# Patient Record
Sex: FEMALE | Race: WHITE | NOT HISPANIC OR LATINO
[De-identification: names, ages, dates, MRNs, and addresses within clinical notes are randomized per-mention and may not be internally consistent; named-entity substitution may affect disease eponyms.]

---

## 2018-05-21 ENCOUNTER — TRANSCRIPTION ENCOUNTER (OUTPATIENT)
Age: 63
End: 2018-05-21

## 2018-05-21 NOTE — ASU PATIENT PROFILE, ADULT - ABLE TO REACH PT
no/Message left on answering machine with pre operative instructions, time of arrival, and  hospital phone number provided.

## 2018-05-22 ENCOUNTER — OUTPATIENT (OUTPATIENT)
Dept: OUTPATIENT SERVICES | Facility: HOSPITAL | Age: 63
LOS: 1 days | End: 2018-05-22
Payer: MEDICAID

## 2018-05-22 VITALS
OXYGEN SATURATION: 98 % | SYSTOLIC BLOOD PRESSURE: 115 MMHG | HEIGHT: 60 IN | TEMPERATURE: 98 F | WEIGHT: 109.35 LBS | DIASTOLIC BLOOD PRESSURE: 62 MMHG | HEART RATE: 73 BPM | RESPIRATION RATE: 14 BRPM

## 2018-05-22 VITALS
HEART RATE: 88 BPM | RESPIRATION RATE: 13 BRPM | SYSTOLIC BLOOD PRESSURE: 101 MMHG | OXYGEN SATURATION: 100 % | DIASTOLIC BLOOD PRESSURE: 65 MMHG

## 2018-05-22 DIAGNOSIS — H35.341 MACULAR CYST, HOLE, OR PSEUDOHOLE, RIGHT EYE: ICD-10-CM

## 2018-05-22 DIAGNOSIS — N83.9 NONINFLAMMATORY DISORDER OF OVARY, FALLOPIAN TUBE AND BROAD LIGAMENT, UNSPECIFIED: Chronic | ICD-10-CM

## 2018-05-22 PROCEDURE — 67042 VIT FOR MACULAR HOLE: CPT | Mod: RT

## 2020-07-16 PROBLEM — E03.9 HYPOTHYROIDISM, UNSPECIFIED: Chronic | Status: ACTIVE | Noted: 2018-05-22

## 2020-07-16 PROBLEM — M54.9 DORSALGIA, UNSPECIFIED: Chronic | Status: ACTIVE | Noted: 2018-05-22

## 2020-09-24 ENCOUNTER — APPOINTMENT (OUTPATIENT)
Dept: FAMILY MEDICINE | Facility: CLINIC | Age: 65
End: 2020-09-24

## 2020-09-25 ENCOUNTER — APPOINTMENT (OUTPATIENT)
Dept: ENDOCRINOLOGY | Facility: CLINIC | Age: 65
End: 2020-09-25
Payer: MEDICAID

## 2020-09-25 VITALS
OXYGEN SATURATION: 99 % | HEIGHT: 60 IN | WEIGHT: 130 LBS | BODY MASS INDEX: 25.52 KG/M2 | DIASTOLIC BLOOD PRESSURE: 74 MMHG | HEART RATE: 83 BPM | SYSTOLIC BLOOD PRESSURE: 110 MMHG

## 2020-09-25 DIAGNOSIS — Z80.9 FAMILY HISTORY OF MALIGNANT NEOPLASM, UNSPECIFIED: ICD-10-CM

## 2020-09-25 DIAGNOSIS — Z87.891 PERSONAL HISTORY OF NICOTINE DEPENDENCE: ICD-10-CM

## 2020-09-25 PROCEDURE — 36415 COLL VENOUS BLD VENIPUNCTURE: CPT

## 2020-09-25 PROCEDURE — 99204 OFFICE O/P NEW MOD 45 MIN: CPT | Mod: 25

## 2020-09-25 RX ORDER — TRAZODONE HYDROCHLORIDE 100 MG/1
100 TABLET ORAL
Refills: 0 | Status: ACTIVE | COMMUNITY

## 2020-09-25 RX ORDER — VORTIOXETINE 20 MG/1
20 TABLET, FILM COATED ORAL
Refills: 0 | Status: ACTIVE | COMMUNITY

## 2020-09-28 LAB
ALBUMIN SERPL ELPH-MCNC: 4.8 G/DL
ALP BLD-CCNC: 75 U/L
ALT SERPL-CCNC: 14 U/L
ANION GAP SERPL CALC-SCNC: 14 MMOL/L
AST SERPL-CCNC: 18 U/L
BILIRUB SERPL-MCNC: 0.2 MG/DL
BUN SERPL-MCNC: 14 MG/DL
CALCIUM SERPL-MCNC: 9.7 MG/DL
CHLORIDE SERPL-SCNC: 100 MMOL/L
CO2 SERPL-SCNC: 25 MMOL/L
CREAT SERPL-MCNC: 0.85 MG/DL
GLUCOSE SERPL-MCNC: 88 MG/DL
POTASSIUM SERPL-SCNC: 4.4 MMOL/L
PROT SERPL-MCNC: 7.1 G/DL
SODIUM SERPL-SCNC: 139 MMOL/L
T4 FREE SERPL-MCNC: 1.4 NG/DL
THYROGLOB AB SERPL-ACNC: <20 IU/ML
THYROPEROXIDASE AB SERPL IA-ACNC: <10 IU/ML
TSH SERPL-ACNC: 1.75 UIU/ML

## 2020-12-29 ENCOUNTER — APPOINTMENT (OUTPATIENT)
Dept: ENDOCRINOLOGY | Facility: CLINIC | Age: 65
End: 2020-12-29
Payer: MEDICAID

## 2020-12-29 VITALS
BODY MASS INDEX: 25.52 KG/M2 | OXYGEN SATURATION: 97 % | SYSTOLIC BLOOD PRESSURE: 120 MMHG | HEIGHT: 60 IN | DIASTOLIC BLOOD PRESSURE: 70 MMHG | HEART RATE: 79 BPM | WEIGHT: 130 LBS

## 2020-12-29 DIAGNOSIS — R59.9 ENLARGED LYMPH NODES, UNSPECIFIED: ICD-10-CM

## 2020-12-29 PROCEDURE — 99214 OFFICE O/P EST MOD 30 MIN: CPT

## 2020-12-29 PROCEDURE — 99072 ADDL SUPL MATRL&STAF TM PHE: CPT

## 2020-12-29 RX ORDER — LEVOTHYROXINE SODIUM 0.05 MG/1
50 TABLET ORAL DAILY
Qty: 90 | Refills: 1 | Status: ACTIVE | COMMUNITY
Start: 1900-01-01 | End: 1900-01-01

## 2020-12-29 RX ORDER — GABAPENTIN 100 MG/1
100 CAPSULE ORAL
Refills: 0 | Status: DISCONTINUED | COMMUNITY
End: 2020-12-29

## 2021-03-05 NOTE — ASU DISCHARGE PLAN (ADULT/PEDIATRIC). - WITH DR
Sabino
CONSTITUTIONAL: no fever, no chills   EYES: no visual changes, no eye pain   ENMT: no nasal congestion, no throat pain  CARDIOVASCULAR: no chest pain, no edema, no palpitations   RESPIRATORY: no shortness of breath, no cough   GASTROINTESTINAL: +abdominal pain, no nausea, no vomiting, no diarrhea, no constipation   GENITOURINARY: no dysuria, no frequency  MUSCULOSKELETAL: no joint pains, no myalgias, no back pain   SKIN: no rashes  NEUROLOGICAL: no weakness, no headache, no dizziness, no slurred speech, no syncope   PSYCHIATRIC: no known mental health illness   HEME/LYMPH: no lymphadenopathy      All other ROS negative except as per HPI

## 2021-06-29 ENCOUNTER — APPOINTMENT (OUTPATIENT)
Dept: ENDOCRINOLOGY | Facility: CLINIC | Age: 66
End: 2021-06-29
Payer: MEDICAID

## 2021-06-29 VITALS
OXYGEN SATURATION: 97 % | SYSTOLIC BLOOD PRESSURE: 120 MMHG | HEART RATE: 79 BPM | HEIGHT: 60 IN | WEIGHT: 138 LBS | TEMPERATURE: 98.2 F | DIASTOLIC BLOOD PRESSURE: 78 MMHG | BODY MASS INDEX: 27.09 KG/M2

## 2021-06-29 PROCEDURE — 99072 ADDL SUPL MATRL&STAF TM PHE: CPT

## 2021-06-29 PROCEDURE — 99214 OFFICE O/P EST MOD 30 MIN: CPT

## 2021-06-29 RX ORDER — DICYCLOMINE HYDROCHLORIDE 20 MG/1
20 TABLET ORAL 3 TIMES DAILY
Refills: 0 | Status: ACTIVE | COMMUNITY
Start: 2021-06-29

## 2021-06-29 RX ORDER — TOPIRAMATE 25 MG/1
25 TABLET, FILM COATED ORAL TWICE DAILY
Refills: 0 | Status: ACTIVE | COMMUNITY
Start: 2021-06-29

## 2021-06-29 RX ORDER — HYOSCYAMINE SULFATE 0.12 MG/1
0.12 TABLET ORAL
Refills: 0 | Status: DISCONTINUED | COMMUNITY
End: 2021-06-29

## 2021-06-29 RX ORDER — ASPIRIN 81 MG
81 TABLET, DELAYED RELEASE (ENTERIC COATED) ORAL DAILY
Refills: 0 | Status: ACTIVE | COMMUNITY
Start: 2021-06-29

## 2021-06-29 RX ORDER — GABAPENTIN 100 MG/1
100 CAPSULE ORAL 3 TIMES DAILY
Refills: 0 | Status: ACTIVE | COMMUNITY
Start: 2021-06-29

## 2021-10-26 ENCOUNTER — APPOINTMENT (OUTPATIENT)
Dept: ENDOCRINOLOGY | Facility: CLINIC | Age: 66
End: 2021-10-26
Payer: MEDICAID

## 2021-10-26 VITALS
DIASTOLIC BLOOD PRESSURE: 78 MMHG | WEIGHT: 140 LBS | BODY MASS INDEX: 27.48 KG/M2 | SYSTOLIC BLOOD PRESSURE: 114 MMHG | HEART RATE: 83 BPM | HEIGHT: 60 IN | OXYGEN SATURATION: 97 %

## 2021-10-26 DIAGNOSIS — R63.5 ABNORMAL WEIGHT GAIN: ICD-10-CM

## 2021-10-26 DIAGNOSIS — M81.0 AGE-RELATED OSTEOPOROSIS W/OUT CURRENT PATHOLOGICAL FRACTURE: ICD-10-CM

## 2021-10-26 DIAGNOSIS — E03.9 HYPOTHYROIDISM, UNSPECIFIED: ICD-10-CM

## 2021-10-26 PROCEDURE — 99215 OFFICE O/P EST HI 40 MIN: CPT

## 2021-11-09 RX ORDER — NALTREXONE HYDROCHLORIDE AND BUPROPION HYDROCHLORIDE 8; 90 MG/1; MG/1
8-90 TABLET, EXTENDED RELEASE ORAL
Qty: 120 | Refills: 2 | Status: ACTIVE | COMMUNITY
Start: 2021-10-26 | End: 1900-01-01

## 2022-02-01 ENCOUNTER — APPOINTMENT (OUTPATIENT)
Dept: ENDOCRINOLOGY | Facility: CLINIC | Age: 67
End: 2022-02-01

## 2022-04-06 ENCOUNTER — APPOINTMENT (OUTPATIENT)
Dept: PAIN MANAGEMENT | Facility: CLINIC | Age: 67
End: 2022-04-06

## 2022-04-06 ENCOUNTER — NON-APPOINTMENT (OUTPATIENT)
Age: 67
End: 2022-04-06

## 2022-04-19 ENCOUNTER — APPOINTMENT (OUTPATIENT)
Dept: PAIN MANAGEMENT | Facility: CLINIC | Age: 67
End: 2022-04-19

## 2022-05-11 ENCOUNTER — APPOINTMENT (OUTPATIENT)
Dept: PAIN MANAGEMENT | Facility: CLINIC | Age: 67
End: 2022-05-11
Payer: MEDICARE

## 2022-05-11 PROCEDURE — 62321 NJX INTERLAMINAR CRV/THRC: CPT

## 2022-05-11 NOTE — PROCEDURE
[FreeTextEntry3] : Date of Service: 05/11/2022 \par \par Account: [id number]\par \par Patient: GIUSEPPE NOVAK \par \par YOB: 1955\par \par Age: 67 year\par \par Surgeon:      Adarsh Martínez DO\par \par Assistant:    None\par \par Pre-Operative Diagnosis:         Cervical Radiculopathy (M54.12)\par \par Post Operative Diagnosis:       Cervical Radiculopathy (M54.12)\par \par Procedure:             RIght paramedian (C7-T1) interlaminar epidural steroid injection under fluoroscopic guidance\par \par Anesthesia:  MAC\par \par This procedure was carried out using fluoroscopic guidance.  The risks and benefits of the procedure were discussed extensively with the patient.  The consent of the patient was obtained and the following procedure was performed.  A timeout was performed with all essential staff present and the site and side were verified.\par \par The patient was placed in the prone position and optimized to patient comfort.  The cervical area was prepped and draped in a sterile fashion.  The fluoroscope visualized the C7-T1 interspace using slight cephalad-caudad angulation and this area was marked.  Using sterile technique the superficial skin was anesthetized with 1% Lidocaine.  A 20 gauge 3.5 inch Tuohy needle was advanced under fluoroscopy until ligament was engaged.  Using a contralateral oblique view, a "loss of resistance" to air technique was utilized in order to gain access to the epidural space.  After negative aspiration for heme and CSF, 1 cc of Omnipaque contrast was administered and the appropriate cervical epidurogram was obtained in the GRANT and A/P view as well as digital subtraction angiography.\par \par A total injectate of 3 cc of preservative free normal saline and 40 mg of Kenalog was then injected into the epidural space while maintaining meaningful verbal contact with the patient.  \par \par The needle was subsequently removed.  Vital signs remained normal.  Pulse oximeter was used throughout the procedure and the patient's pulse and oxygen saturation remained within normal limits.  The patient tolerated the procedure well.  There were no complications.  The patient was instructed to apply ice over the injection sites for twenty minutes every two hours for the next 24 to 48 hours.\par \par Disposition:\par      1. The patient was advised to F/U in 1-2 weeks to assess the response to the injection.\par      2. The patient was also instructed to contact me immediately if there were any concerns related to the procedure performed.

## 2022-06-07 ENCOUNTER — APPOINTMENT (OUTPATIENT)
Dept: PAIN MANAGEMENT | Facility: CLINIC | Age: 67
End: 2022-06-07
Payer: MEDICARE

## 2022-06-07 VITALS — HEIGHT: 60 IN | BODY MASS INDEX: 27.48 KG/M2 | WEIGHT: 140 LBS

## 2022-06-07 PROCEDURE — 99214 OFFICE O/P EST MOD 30 MIN: CPT

## 2022-06-08 NOTE — ASSESSMENT
[FreeTextEntry1] : A thorough discussion occurred regarding available pain management treatment options including interventional, rehabilitative, pharmacological, and alternative modalities with the patient. We will proceed with the following:\par \par Interventional treatment options:\par - can consider repeat right PM C7-T1 MELISSA with return of right UE radicular pain \par - would consider lumbar directed intervention pending review of MRI imaging \par - May consider cervical facet directed intervention for ongoing axial neck pain \par \par Rehabilitative options:\par - Patient defers PT trial at present time\par - encouraged participation in HEP \par \par Medication based treatment options:\par - initiate meloxicam 15mg daily x 7-10 days then PRN \par \par Complementary treatment options:\par - lifestyle modifications discussed\par - See additional instructions below\par \par Additional treatment recommendations as follows:\par - obtain MRI lumbar spine \par - Follow up after imaging studies for further recommendations\par \par We have discussed the risks, benefits, and alternatives NSAID therapy including but not limited to the risk of bleeding, thrombosis, gastric mucosal irritation/ulceration, allergic reaction and kidney dysfunction; the patient verbalizes an understanding.\par \par The documentation recorded by the scribe, in my presence, accurately reflects the service I personally performed and the decisions made by me with my edits as appropriate.\par \par I, [place] acting as scribe, attest that this documentation has been prepared under the direction and in the presence of Provider Adarsh Martínez DO.

## 2022-06-08 NOTE — PHYSICAL EXAM
[Normal Coordination] : normal coordination [Normal Mood and Affect] : normal mood and affect [Orientated] : orientated [Able to Communicate] : able to communicate [Well Developed] : well developed [Well Nourished] : well nourished [de-identified] : Lumbar Spine Exam: \par \par Inspection: \par erythema (-) \par ecchymosis (-) \par rashes (-) \par alignment: no scoliosis\par \par Palpation:  \par paraspinal tenderness:                  L (-) ; R (-) \par thoracic paraspinal tenderness:    L (-) ; R (-) \par sciatic nerve tenderness :             L (-) ; R (+) \par SI joint tenderness:                        L (-) ; R (-) \par GTB tenderness:                            L (-);  R (-) \par \par ROM:   \par Full ROM with mild stiffness with flexion\par Pain with flexion \par \par Strength:                   Right       Left    \par Hip Flexion:                (5/5)       (5/5) \par Quadriceps:               (5/5)       (5/5) \par Hamstrings:                (5/5)       (5/5) \par Ankle Dorsiflexion:     (5/5)       (5/5) \par EHL:                            (5/5)       (5/5) \par Ankle Plantarflexion:  (5/5)       (5/5) \par \par Special Tests: \par SLR:                      R (+) ; L (-) \par Facet loading:       R (-) ; L (-) \par ARINA test:          R (-) ; L (-) \par XSLR:                   R (-) ; L (-) \par Jethro's test:        R (-) ; L(-) \par Tight Hamstrings   R (-) ; L (-) \par \par Neurologic: \par Light touch intact throughout LE \par Reflexes normal and symmetric \par \par Gait: \par non- antalgic gait  [] : negative Ware reflex [de-identified] : some difficulty with tandem walk

## 2022-06-08 NOTE — HISTORY OF PRESENT ILLNESS
[Neck] : neck [Lower back] : lower back [Sudden] : sudden [6] : 6 [Dull/Aching] : dull/aching [Constant] : constant [Meds] : meds [Injection therapy] : injection therapy [Walking] : walking [Bending forward] : bending forward [Extending back] : extending back [Stairs] : stairs [FreeTextEntry1] : 06/07/22 - Patient presents for a FUV following an C7-T1 MELISSA on 05/11/22. Patient reports 80% resolution of symptoms following the injection. She is pleased with her response to the injection. Patient reports increase strength and ROM. \par \par Patient c/o low back pain radiating to the right buttock and RLE. Ongoing for months.  Patient denies b/b dysfunction, LE weakness, or saddle numbness. \par \par 03/10/22 - Patient presents for initial evaluation. Patient c/o neck pain radiating to the right shoulder, elbow and fingers. Patient reports 60/40 neck versus arm pain. Patient reports dropping items from the right hand. Patient reports numbness to the right digits. Patient reports ongoing exacerbation of pain started 2 months ago. \par \par Previous Injections: \par 1) C7-T1 MELISSA - (05/11/22) \par \par Pertinent Surgical History: N/A\par \par Imaging:\par Cervical Spine MRI 02/15/22 - [facility]\par \par C2-C3 :There is no significant spinal canal or neural foraminal stenosis.\par C3-C4 :There is no significant spinal canal or neural foraminal stenosis.\par C4-C5: Mild uncovering of the disc and a disc osteophyte complex mildly indent the ventral thecal sac. There is mild bilateral facet arthrosis.\par C5-C6: Uncovering of the disc and a disc osteophyte complex with bilateral uncovertebral joint spurring effaces the anterior cerebrospinal fluid column and mildly narrow the right neural foramen, progressed since 2019. There is left facet arthrosis.\par C6-C7: A disc osteophyte complex and bilateral uncovertebral joint spurring mildly effaces the anterior cerebrospinal fluid column and mildly narrow the right neural foramen, progressed in the interim.\par C7-T1: Mild uncovering of the disc and mild narrows the right neural foramen, progressed in the interim. There is no significant spinal canal stenosis. The posterior paraspinal muscles are symmetric.\par \par Physician Disclaimer: I have personally reviewed and confirmed all HPI data with the patient. [] : no [FreeTextEntry7] : lower back and buttock

## 2022-06-08 NOTE — REASON FOR VISIT
[Follow-Up Visit] : a follow-up pain management visit [FreeTextEntry2] : neck pain and follow up to injection

## 2022-06-30 ENCOUNTER — RESULT REVIEW (OUTPATIENT)
Age: 67
End: 2022-06-30

## 2022-07-14 ENCOUNTER — APPOINTMENT (OUTPATIENT)
Dept: PAIN MANAGEMENT | Facility: CLINIC | Age: 67
End: 2022-07-14

## 2022-07-14 VITALS — WEIGHT: 134 LBS | HEIGHT: 61 IN | BODY MASS INDEX: 25.3 KG/M2

## 2022-07-14 PROCEDURE — 99214 OFFICE O/P EST MOD 30 MIN: CPT

## 2022-07-15 NOTE — DATA REVIEWED
[Lumbar Spine] : lumbar spine [MRI] : MRI [Cervical Spine] : cervical spine [Report was reviewed and noted in the chart] : The report was reviewed and noted in the chart [I reviewed the films/CD] : I reviewed the films/CD

## 2022-07-15 NOTE — ASSESSMENT
[FreeTextEntry1] : A thorough discussion occurred regarding available pain management treatment options including interventional, rehabilitative, pharmacological, and alternative modalities with the patient. We will proceed with the following:\par \par Interventional treatment options:\par - Proceed with right L5-S1 TFESI with fluoroscopic guidance \par - can consider repeat right PM C7-T1 MELISSA with return of right UE radicular pain \par - would consider lumbar facet directed intervention if ongoing axial lower back pain\par - see additional instructions below\par \par Rehabilitative options:\par - Patient defers PT trial at present time\par - encouraged participation in HEP \par \par Medication based treatment options:\par - Poor candidate for NSAIDs secondary to GI sensitivity\par - Currently on oxycodone 5/325 PRN with neurology; counseled against long term use and advised this would not be part of our treatment plan \par \par Complementary treatment options:\par - lifestyle modifications discussed\par \par Additional treatment recommendations as follows:\par - Follow up 1-2 weeks post injection for assessment of efficacy and further recommendations. \par \par The risks, benefits and alternatives of the proposed procedure were explained in detail with the patient.  The risks outlined include, but are not limited to, infection, bleeding, nerve injury, a temporary increase in pain, failure to resolve symptoms, allergic reaction, and possible elevation of blood sugar in diabetics.  All questions were answered to patient's apparent satisfaction and he/she verbalized an understanding. \par \par We have discussed the risks, benefits, and alternatives NSAID therapy including but not limited to the risk of bleeding, thrombosis, gastric mucosal irritation/ulceration, allergic reaction and kidney dysfunction; the patient verbalizes an understanding.\par \par I, Dandre Conner acting as scribe, attest that this documentation has been prepared under the direction and in the presence of Provider Adarsh Martínez DO.\par \par The documentation recorded by the scribe, in my presence, accurately reflects the service I personally performed and the decisions made by me with my edits as appropriate.

## 2022-07-15 NOTE — HISTORY OF PRESENT ILLNESS
[Neck] : neck [Lower back] : lower back [10] : 10 [Burning] : burning [Dull/Aching] : dull/aching [Constant] : constant [Sleep] : sleep [Meds] : meds [Nothing helps with pain getting better] : Nothing helps with pain getting better [Sitting] : sitting [Standing] : standing [Walking] : walking [Lying in bed] : lying in bed [Retired] : Work status: retired [FreeTextEntry1] : 07/14/22 - Patient presents for MRI review. Patient c/o constant axial lower back pain with radiation to the right buttock pain and right lower extremity. Was unable to tolerate Meloxicam due to GI upset.  \par \par 06/07/22 - Patient presents for a FUV following an C7-T1 MELISSA on 05/11/22. Patient reports 80% resolution of symptoms following the injection. She is pleased with her response to the injection. Patient reports increase strength and ROM\par \par Patient c/o low back pain radiating to the right buttock and RLE. Ongoing for months. Patient denies b/b dysfunction, LE weakness, or saddle numbness. \par \par 03/10/22 - Patient presents for initial evaluation. Patient c/o neck pain radiating to the right shoulder, elbow and fingers. Patient reports 60/40 neck versus arm pain. Patient reports dropping items from the right hand. Patient reports numbness to the right digits. Patient reports ongoing exacerbation of pain started 2 months ago. \par \par Previous Injections: \par 1) C7-T1 MELISSA - (05/11/22) \par \par Pertinent Surgical History: N/A\par \par Imaging:\par 1) Cervical Spine MRI 02/15/22 - [facility]\par \par C2-C3 :There is no significant spinal canal or neural foraminal stenosis.\par C3-C4 :There is no significant spinal canal or neural foraminal stenosis.\par C4-C5: Mild uncovering of the disc and a disc osteophyte complex mildly indent the ventral thecal sac. There is mild bilateral facet arthrosis.\par C5-C6: Uncovering of the disc and a disc osteophyte complex with bilateral uncovertebral joint spurring effaces the anterior cerebrospinal fluid column and mildly narrow the right neural foramen, progressed since 2019. There is left facet arthrosis.\par C6-C7: A disc osteophyte complex and bilateral uncovertebral joint spurring mildly effaces the anterior cerebrospinal fluid column and mildly narrow the right neural foramen, progressed in the interim.\par C7-T1: Mild uncovering of the disc and mild narrows the right neural foramen, progressed in the interim. There is no significant spinal canal stenosis. The posterior paraspinal muscles are symmetric.\par \par 2) Lumbar Spine MRI (06/30/22) - [facility]\par \par T12-L1: There is no disc herniation, significant central canal or neural foraminal stenosis.\par L1-2: There is mild disc bulge and facet arthropathy. There is no significant central canal or foraminal stenosis.\par L2-3: There is mild to moderate left facet arthropathy. There is no disc herniation, significant central canal or neural foraminal stenosis.\par L3-4: There is mild to moderate left facet arthropathy and ligamentous hypertrophy. There is a small left foraminal disc herniation contacting left L3 nerve root. There is no significant central canal stenosis.\par L4-5: There is mild disc bulge. There is left greater than right facet arthropathy and ligamentous hypertrophy. There is mild left foraminal encroachment. There is no significant central canal stenosis.\par L5-S1: There is mild disc bulge. There is moderate right facet arthropathy with fluid in the right facet joint, encroaching if not impinging upon the right L5 nerve root. There is also encroachment upon the descending right S1 nerve root. There is no significant central canal stenosis.\par \par Physician Disclaimer: I have personally reviewed and confirmed all HPI data with the patient.  [] : no [FreeTextEntry7] : B/L SHOULDERS , RT HIP  [de-identified] : L MRI

## 2022-07-15 NOTE — PHYSICAL EXAM
[de-identified] : Lumbar Spine Exam: \par \par Inspection: \par erythema (-) \par ecchymosis (-) \par rashes (-) \par alignment: no scoliosis\par \par Palpation: \par paraspinal tenderness: L (-) ; R (-) \par thoracic paraspinal tenderness: L (-) ; R (-) \par sciatic nerve tenderness : L (-) ; R (+) \par SI joint tenderness: L (-) ; R (-) \par GTB tenderness: L (-); R (-) \par \par ROM: \par Full ROM with mild stiffness with flexion\par Pain with flexion \par \par Strength: Right Left \par Hip Flexion: (5/5) (5/5) \par Quadriceps: (5/5) (5/5) \par Hamstrings: (5/5) (5/5) \par Ankle Dorsiflexion: (5/5) (5/5) \par EHL: (5/5) (5/5) \par Ankle Plantarflexion: (5/5) (5/5) \par \par Special Tests: \par SLR: R (+) ; L (-) \par Facet loading: R (-) ; L (-) \par ARINA test: R (-) ; L (-) \par XSLR: R (-) ; L (-) \par Jethro's test: R (-) ; L(-) \par Tight Hamstrings R (-) ; L (-) \par \par Neurologic: \par Light touch intact throughout LE \par Reflexes normal and symmetric \par \par Gait: \par non- antalgic gait

## 2022-07-29 ENCOUNTER — APPOINTMENT (OUTPATIENT)
Dept: PAIN MANAGEMENT | Facility: CLINIC | Age: 67
End: 2022-07-29

## 2022-07-29 PROCEDURE — 64483 NJX AA&/STRD TFRM EPI L/S 1: CPT | Mod: RT

## 2022-09-06 ENCOUNTER — APPOINTMENT (OUTPATIENT)
Dept: PAIN MANAGEMENT | Facility: CLINIC | Age: 67
End: 2022-09-06

## 2022-09-06 VITALS — BODY MASS INDEX: 25.3 KG/M2 | HEIGHT: 61 IN | WEIGHT: 134 LBS

## 2022-09-06 PROCEDURE — 99214 OFFICE O/P EST MOD 30 MIN: CPT

## 2022-09-06 RX ORDER — MELOXICAM 15 MG/1
15 TABLET ORAL
Qty: 30 | Refills: 2 | Status: DISCONTINUED | COMMUNITY
Start: 2022-06-07 | End: 2022-09-06

## 2022-09-06 NOTE — REVIEW OF SYSTEMS
"   01/28/20 1314   Behavioral Health   Hallucinations denies / not responding to hallucinations   Thinking intact   Orientation person: oriented;place: oriented;date: oriented;time: oriented   Memory baseline memory   Insight insight appropriate to situation   Judgement intact   Eye Contact at examiner   Affect full range affect   Mood mood is calm   Physical Appearance/Attire appears stated age;attire appropriate to age and situation;neat   Hygiene well groomed   Suicidality other (see comments)  (none reported by pt)   1. Wish to be Dead (Recent) No   2. Non-Specific Active Suicidal Thoughts (Recent) No   Self Injury other (see comment)  (none reported or observed)   Elopement   (no behaviors noted)   Speech clear;coherent   Overt Aggression Scale   Verbal Aggression 0   Aggression against Property 0   Auto-Aggression 0   Physical Aggression 0   Overt Aggression Total Score 0   Activities of Daily Living   Hygiene/Grooming independent   Oral Hygiene independent   Dress independent;street clothes   Room Organization independent   Significant Event   Significant Event Other (see comments)  (shift summary)   Patient had a calm and pleasant shift.    Patient did not require seclusion/restraints to manage behavior.    Tamra Jaimes did participate in groups and was visible in the milieu.    Notable mental health symptoms during this shift:depressed mood  decreased energy    Patient is working on these coping/social skills: Sharing feelings  Distraction  Positive social behaviors  Asking for help    Visitors during this shift included N/A.  Overall, the visit was N/A.  Significant events during the visit included N/A.    Other information about this shift: pt attended and participated in most groups. Pt is currently napping. Pt did not want to check in with this writer. Pt did report being \"excited to go home Thursday.\" pt did not endorse any SI/SIB thoughts this shift.    " [Negative] : Heme/Lymph

## 2022-09-06 NOTE — HISTORY OF PRESENT ILLNESS
[Neck] : neck [Lower back] : lower back [10] : 10 [Burning] : burning [Dull/Aching] : dull/aching [Constant] : constant [Sleep] : sleep [Meds] : meds [Nothing helps with pain getting better] : Nothing helps with pain getting better [Sitting] : sitting [Standing] : standing [Walking] : walking [Lying in bed] : lying in bed [Retired] : Work status: retired [FreeTextEntry1] : 09/06/22 - Patient presents for a FUV following an right L5-S1 TFESI on 07/29/22. Patient reports almost 100% resolution for a few weeks followed by a return of pain, almost to baseline. \par \par Patient also c/o neck pain radiating to the RUE. Denies strength loss to the UE. Utilizes OTC Advil with benefit. She reports GI distress with meloxicam. Good relief with prior MELISSA. \par \par 07/14/22 - Patient presents for MRI review. Patient c/o constant axial lower back pain with radiation to the right buttock pain and right lower extremity. Was unable to tolerate Meloxicam due to GI upset.  \par \par 06/07/22 - Patient presents for a FUV following an C7-T1 MELISSA on 05/11/22. Patient reports 80% resolution of symptoms following the injection. She is pleased with her response to the injection. Patient reports increase strength and ROM\par \par Patient c/o low back pain radiating to the right buttock and RLE. Ongoing for months. Patient denies b/b dysfunction, LE weakness, or saddle numbness. \par \par 03/10/22 - Patient presents for initial evaluation. Patient c/o neck pain radiating to the right shoulder, elbow and fingers. Patient reports 60/40 neck versus arm pain. Patient reports dropping items from the right hand. Patient reports numbness to the right digits. Patient reports ongoing exacerbation of pain started 2 months ago. \par \par Previous Injections: \par 1) C7-T1 MELISSA - (05/11/22) \par 2) right L5-S1 TFESI (07/29/22) \par \par Pertinent Surgical History: N/A\par \par Imaging:\par 1) Cervical Spine MRI 02/15/22 - [facility]\par \par C2-C3 :There is no significant spinal canal or neural foraminal stenosis.\par C3-C4 :There is no significant spinal canal or neural foraminal stenosis.\par C4-C5: Mild uncovering of the disc and a disc osteophyte complex mildly indent the ventral thecal sac. There is mild bilateral facet arthrosis.\par C5-C6: Uncovering of the disc and a disc osteophyte complex with bilateral uncovertebral joint spurring effaces the anterior cerebrospinal fluid column and mildly narrow the right neural foramen, progressed since 2019. There is left facet arthrosis.\par C6-C7: A disc osteophyte complex and bilateral uncovertebral joint spurring mildly effaces the anterior cerebrospinal fluid column and mildly narrow the right neural foramen, progressed in the interim.\par C7-T1: Mild uncovering of the disc and mild narrows the right neural foramen, progressed in the interim. There is no significant spinal canal stenosis. The posterior paraspinal muscles are symmetric.\par \par 2) Lumbar Spine MRI (06/30/22) - [facility]\par \par T12-L1: There is no disc herniation, significant central canal or neural foraminal stenosis.\par L1-2: There is mild disc bulge and facet arthropathy. There is no significant central canal or foraminal stenosis.\par L2-3: There is mild to moderate left facet arthropathy. There is no disc herniation, significant central canal or neural foraminal stenosis.\par L3-4: There is mild to moderate left facet arthropathy and ligamentous hypertrophy. There is a small left foraminal disc herniation contacting left L3 nerve root. There is no significant central canal stenosis.\par L4-5: There is mild disc bulge. There is left greater than right facet arthropathy and ligamentous hypertrophy. There is mild left foraminal encroachment. There is no significant central canal stenosis.\par L5-S1: There is mild disc bulge. There is moderate right facet arthropathy with fluid in the right facet joint, encroaching if not impinging upon the right L5 nerve root. There is also encroachment upon the descending right S1 nerve root. There is no significant central canal stenosis.\par \par Physician Disclaimer: I have personally reviewed and confirmed all HPI data with the patient.  [] : no [FreeTextEntry7] : B/L SHOULDERS , RT HIP  [de-identified] : L MRI

## 2022-09-06 NOTE — ASSESSMENT
[FreeTextEntry1] : A thorough discussion occurred regarding available pain management treatment options including interventional, rehabilitative, pharmacological, and alternative modalities with the patient. We will proceed with the following:\par \par Interventional treatment options:\par - Proceed with right PM C7-T1 MELISSA with fluoroscopic guidance (40mg Kenalog) - will call \par - Can consider right PM L5-S1 LESI with return of right radicular pain \par - would consider lumbar facet directed intervention if ongoing axial lower back pain\par - see additional instructions below\par \par Rehabilitative options:\par - Patient continues to defers formal PT trial at present time\par - encouraged participation in HEP \par \par Medication based treatment options:\par - Poor candidate for NSAIDs secondary to GI sensitivity\par - Currently on oxycodone 5/325 PRN with neurology; previously counseled against long term use and advised this would not be part of our treatment plan \par - up titrate gabapentin to 400mg daily \par \par Complementary treatment options:\par - lifestyle modifications discussed\par \par Additional treatment recommendations as follows:\par - Follow up 1-2 weeks post injection for assessment of efficacy and further recommendations. \par \par The risks, benefits and alternatives of the proposed procedure were explained in detail with the patient.  The risks outlined include, but are not limited to, infection, bleeding, nerve injury, a temporary increase in pain, failure to resolve symptoms, allergic reaction, and possible elevation of blood sugar in diabetics.  All questions were answered to patient's apparent satisfaction and he/she verbalized an understanding. \par \par We have discussed the risks, benefits, and alternatives NSAID therapy including but not limited to the risk of bleeding, thrombosis, gastric mucosal irritation/ulceration, allergic reaction and kidney dysfunction; the patient verbalizes an understanding.\par \par MILAGROS, [place] acting as scribe, attest that this documentation has been prepared under the direction and in the presence of Provider Adarsh Martínez DO.\par \par The documentation recorded by the scribe, in my presence, accurately reflects the service I personally performed and the decisions made by me with my edits as appropriate.

## 2022-09-06 NOTE — PHYSICAL EXAM
[de-identified] : Lumbar Spine Exam: \par \par Inspection: \par erythema (-) \par ecchymosis (-) \par rashes (-) \par alignment: no scoliosis\par \par Palpation: \par paraspinal tenderness: L (-) ; R (-) \par thoracic paraspinal tenderness: L (-) ; R (-) \par sciatic nerve tenderness : L (-) ; R (+) \par SI joint tenderness: L (-) ; R (-) \par GTB tenderness: L (-); R (-) \par \par ROM: \par Full ROM with mild stiffness with flexion\par Pain with flexion \par \par Strength: Right Left \par Hip Flexion: (5/5) (5/5) \par Quadriceps: (5/5) (5/5) \par Hamstrings: (5/5) (5/5) \par Ankle Dorsiflexion: (5/5) (5/5) \par EHL: (5/5) (5/5) \par Ankle Plantarflexion: (5/5) (5/5) \par \par Special Tests: \par SLR: R (eq) ; L (-) \par Facet loading: R (+) ; L (+) \par ARINA test: R (-) ; L (-) \par Tight Hamstrings R (-) ; L (-) \par \par Neurologic: \par Light touch intact throughout LE \par Reflexes normal and symmetric \par \par Gait: \par non- antalgic gait  [] : negative Ware reflex

## 2022-10-05 ENCOUNTER — APPOINTMENT (OUTPATIENT)
Dept: PAIN MANAGEMENT | Facility: CLINIC | Age: 67
End: 2022-10-05

## 2022-10-05 PROCEDURE — 62321 NJX INTERLAMINAR CRV/THRC: CPT

## 2022-10-05 NOTE — PROCEDURE
[FreeTextEntry3] : Date of Service: 10/05/2022 \par \par Account: [id number]\par \par Patient: GIUSEPPE NOVAK \par \par YOB: 1955\par \par Age: 67 year\par \par Surgeon:      Adarsh Martínez DO\par \par Assistant:    None\par \par Pre-Operative Diagnosis:         Cervical Radiculopathy (M54.12)\par \par Post Operative Diagnosis:       Cervical Radiculopathy (M54.12)\par \par Procedure:             Right paramedian (C7-T1) interlaminar epidural steroid injection under fluoroscopic guidance\par \par Anesthesia:  MAC\par \par This procedure was carried out using fluoroscopic guidance.  The risks and benefits of the procedure were discussed extensively with the patient.  The consent of the patient was obtained and the following procedure was performed.   A timeout was performed with all essential staff present and the site and side were verified.\par \par The patient was placed in the prone position and optimized to patient comfort.  The cervical area was prepped and draped in a sterile fashion.  The fluoroscope visualized the C7-T1 interspace using slight cephalad-caudad angulation and this area was marked.  Using sterile technique the superficial skin was anesthetized with 1% Lidocaine.  A 20 gauge 3.5 inch Tuohy needle was advanced under fluoroscopy until ligament was engaged.  Using a contralateral oblique view, a "loss of resistance" to air technique was utilized in order to gain access to the epidural space.  After negative aspiration for heme and CSF, 1 cc of Omnipaque contrast was administered and the appropriate cervical epidurogram was obtained in the GRANT and A/P view as well as digital subtraction angiography.\par \par A total injectate of 3 cc of preservative free normal saline and 40 mg of Kenalog was then injected into the epidural space while maintaining meaningful verbal contact with the patient.  \par \par The needle was subsequently removed.  Vital signs remained normal.  Pulse oximeter was used throughout the procedure and the patient's pulse and oxygen saturation remained within normal limits.  The patient tolerated the procedure well.  There were no complications.  The patient was instructed to apply ice over the injection sites for twenty minutes every two hours for the next 24 to 48 hours.\par \par Disposition:\par      1. The patient was advised to F/U in 1-2 weeks to assess the response to the injection.\par      2. The patient was also instructed to contact me immediately if there were any concerns related to the procedure performed.

## 2022-10-25 ENCOUNTER — APPOINTMENT (OUTPATIENT)
Dept: PAIN MANAGEMENT | Facility: CLINIC | Age: 67
End: 2022-10-25
Payer: MEDICARE

## 2022-10-25 VITALS — WEIGHT: 134 LBS | BODY MASS INDEX: 25.3 KG/M2 | HEIGHT: 61 IN

## 2022-10-25 DIAGNOSIS — M79.18 MYALGIA, OTHER SITE: ICD-10-CM

## 2022-10-25 DIAGNOSIS — M47.22 OTHER SPONDYLOSIS WITH RADICULOPATHY, CERVICAL REGION: ICD-10-CM

## 2022-10-25 DIAGNOSIS — M54.12 RADICULOPATHY, CERVICAL REGION: ICD-10-CM

## 2022-10-25 PROCEDURE — 99213 OFFICE O/P EST LOW 20 MIN: CPT

## 2022-10-25 NOTE — PHYSICAL EXAM
[de-identified] : Lumbar Spine Exam: \par \par Inspection: \par erythema (-) \par ecchymosis (-) \par rashes (-) \par alignment: no scoliosis\par \par Palpation: \par paraspinal tenderness: L (-) ; R (-) \par thoracic paraspinal tenderness: L (-) ; R (-) \par sciatic nerve tenderness : L (-) ; R (+) \par SI joint tenderness: L (-) ; R (-) \par GTB tenderness: L (-); R (-) \par \par ROM: \par Full ROM with mild stiffness with flexion\par Pain with flexion >extension\par \par Strength: Right Left \par Hip Flexion: (5/5) (5/5) \par Quadriceps: (5/5) (5/5) \par Hamstrings: (5/5) (5/5) \par Ankle Dorsiflexion: (5/5) (5/5) \par EHL: (5/5) (5/5) \par Ankle Plantarflexion: (5/5) (5/5) \par \par Special Tests: \par SLR: R (-) ; L (-) \par Facet loading: R (+) ; L (+) \par ARINA test: R (-) ; L (-) \par Tight Hamstrings R (-) ; L (-) \par \par Neurologic: \par Light touch intact throughout LE \par Reflexes normal and symmetric \par \par Gait: \par non- antalgic gait  [] : negative Ware reflex

## 2022-10-25 NOTE — ASSESSMENT
[FreeTextEntry1] : A thorough discussion occurred regarding available pain management treatment options including interventional, rehabilitative, pharmacological, and alternative modalities with the patient. We will proceed with the following:\par \par Interventional treatment options:\par - hold off at present time \par - discussed limitations of corticosteroid administration; advised best use for episodes of severe pain exacerbations \par - would consider lumbar facet directed intervention if ongoing axial lower back pain\par - see additional instructions below\par \par Rehabilitative options:\par - initiate PT trial for cervical spine\par - encouraged participation in HEP \par \par Medication based treatment options:\par - Poor candidate for NSAIDs secondary to GI sensitivity\par - Currently on oxycodone 5/325 PRN with neurology; previously counseled against long term use and advised this would not be part of our treatment plan \par - continue Gabapentin 200mg BID, further titration as per neurology \par \par Complementary treatment options:\par - lifestyle modifications discussed\par - start home cervical traction - prescription provided\par \par Additional treatment recommendations as follows:\par - Follow up 3 months or PRN basis\par \par I, Gisela Salcedo, acting as scribe, attest that this documentation has been prepared under the direction and in the presence of Provider Adarsh Martínez DO.\par \par The documentation recorded by the scribe, in my presence, accurately reflects the service I personally performed and the decisions made by me with my edits as appropriate.

## 2022-10-25 NOTE — HISTORY OF PRESENT ILLNESS
[Neck] : neck [Lower back] : lower back [10] : 10 [Burning] : burning [Dull/Aching] : dull/aching [Constant] : constant [Sleep] : sleep [Meds] : meds [Nothing helps with pain getting better] : Nothing helps with pain getting better [Sitting] : sitting [Standing] : standing [Walking] : walking [Lying in bed] : lying in bed [Retired] : Work status: retired [FreeTextEntry1] : 10/25/22 -  Patient presents after C7-T1 MELISSA on 10/5/2022.  She reports that she had about 75% improvement from procedure; less so than with previous cervical injection. She states after the procedure her radicular pain returned about a week later although not as severe. She complains of neck pain which radiates down her right upper extremities. Paresthesias to right upper extremity. She has a history of carpal tunnel. She also notes right sciatic nerve tenderness. \par \par 9/06/22 - Patient presents for a FUV following an right L5-S1 TFESI on 07/29/22. Patient reports almost 100% resolution for a few weeks followed by a return of pain, almost to baseline. \par \par Patient also c/o neck pain radiating to the RUE. Denies strength loss to the UE. Utilizes OTC Advil with benefit. She reports GI distress with meloxicam. Good relief with prior MELISSA. \par \par 07/14/22 - Patient presents for MRI review. Patient c/o constant axial lower back pain with radiation to the right buttock pain and right lower extremity. Was unable to tolerate Meloxicam due to GI upset.  \par \par 06/07/22 - Patient presents for a FUV following an C7-T1 MELISSA on 05/11/22. Patient reports 80% resolution of symptoms following the injection. She is pleased with her response to the injection. Patient reports increase strength and ROM\par \par Patient c/o low back pain radiating to the right buttock and RLE. Ongoing for months. Patient denies b/b dysfunction, LE weakness, or saddle numbness. \par \par 03/10/22 - Patient presents for initial evaluation. Patient c/o neck pain radiating to the right shoulder, elbow and fingers. Patient reports 60/40 neck versus arm pain. Patient reports dropping items from the right hand. Patient reports numbness to the right digits. Patient reports ongoing exacerbation of pain started 2 months ago. \par \par Previous Injections: \par 1) C7-T1 MELISSA - (05/11/22, 10/5/22) \par 2) right L5-S1 TFESI (07/29/22) \par \par Pertinent Surgical History: N/A\par \par Imaging:\par 1) Cervical Spine MRI 02/15/22 - [facility]\par \par C2-C3 :There is no significant spinal canal or neural foraminal stenosis.\par C3-C4 :There is no significant spinal canal or neural foraminal stenosis.\par C4-C5: Mild uncovering of the disc and a disc osteophyte complex mildly indent the ventral thecal sac. There is mild bilateral facet arthrosis.\par C5-C6: Uncovering of the disc and a disc osteophyte complex with bilateral uncovertebral joint spurring effaces the anterior cerebrospinal fluid column and mildly narrow the right neural foramen, progressed since 2019. There is left facet arthrosis.\par C6-C7: A disc osteophyte complex and bilateral uncovertebral joint spurring mildly effaces the anterior cerebrospinal fluid column and mildly narrow the right neural foramen, progressed in the interim.\par C7-T1: Mild uncovering of the disc and mild narrows the right neural foramen, progressed in the interim. There is no significant spinal canal stenosis. The posterior paraspinal muscles are symmetric.\par \par 2) Lumbar Spine MRI (06/30/22) - [facility]\par \par T12-L1: There is no disc herniation, significant central canal or neural foraminal stenosis.\par L1-2: There is mild disc bulge and facet arthropathy. There is no significant central canal or foraminal stenosis.\par L2-3: There is mild to moderate left facet arthropathy. There is no disc herniation, significant central canal or neural foraminal stenosis.\par L3-4: There is mild to moderate left facet arthropathy and ligamentous hypertrophy. There is a small left foraminal disc herniation contacting left L3 nerve root. There is no significant central canal stenosis.\par L4-5: There is mild disc bulge. There is left greater than right facet arthropathy and ligamentous hypertrophy. There is mild left foraminal encroachment. There is no significant central canal stenosis.\par L5-S1: There is mild disc bulge. There is moderate right facet arthropathy with fluid in the right facet joint, encroaching if not impinging upon the right L5 nerve root. There is also encroachment upon the descending right S1 nerve root. There is no significant central canal stenosis.\par \par Physician Disclaimer: I have personally reviewed and confirmed all HPI data with the patient.  [] : no [FreeTextEntry7] : B/L SHOULDERS , RT HIP  [de-identified] : L MRI

## 2023-03-06 ENCOUNTER — APPOINTMENT (OUTPATIENT)
Dept: ORTHOPEDIC SURGERY | Facility: CLINIC | Age: 68
End: 2023-03-06
Payer: MEDICARE

## 2023-03-06 VITALS — WEIGHT: 134 LBS | HEIGHT: 61 IN | BODY MASS INDEX: 25.3 KG/M2

## 2023-03-06 DIAGNOSIS — Z00.00 ENCOUNTER FOR GENERAL ADULT MEDICAL EXAMINATION W/OUT ABNORMAL FINDINGS: ICD-10-CM

## 2023-03-06 DIAGNOSIS — M25.851 OTHER SPECIFIED JOINT DISORDERS, RIGHT HIP: ICD-10-CM

## 2023-03-06 PROCEDURE — 73503 X-RAY EXAM HIP UNI 4/> VIEWS: CPT | Mod: RT

## 2023-03-06 PROCEDURE — 72100 X-RAY EXAM L-S SPINE 2/3 VWS: CPT

## 2023-03-06 PROCEDURE — 99214 OFFICE O/P EST MOD 30 MIN: CPT

## 2023-03-06 RX ORDER — MELOXICAM 15 MG/1
15 TABLET ORAL
Qty: 30 | Refills: 1 | Status: ACTIVE | COMMUNITY
Start: 2023-03-06 | End: 1900-01-01

## 2023-03-06 NOTE — ASSESSMENT
[FreeTextEntry1] : The patient was advised of the diagnosis.  The natural history of the pathology was explained in full to the patient in layman's terms. All questions were answered.  The risks and benefits of surgical and non-surgical treatment alternatives were explained in full to the patient.\par \par Entered by Analy Prajapati acting as scribe.

## 2023-03-06 NOTE — HISTORY OF PRESENT ILLNESS
[8] : 8 [Burning] : burning [Dull/Aching] : dull/aching [Sharp] : sharp [Constant] : constant [Rest] : rest [Meds] : meds [Walking] : walking [de-identified] : 3/6/23: 67f presents with right hip pain over the groin and also c/o pain over the buttock and posterior thigh. Started 4 weeks ago without specific injury. Went to an ER, underwent CT scan. 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 \par Retired.  [FreeTextEntry1] : rt hip [FreeTextEntry3] : 4 weeks ago [FreeTextEntry5] : no injury [de-identified] : CT

## 2023-03-06 NOTE — IMAGING
[Disc space narrowing] : Disc space narrowing [Right] : right hip with pelvis [AP] : anteroposterior [Lateral] : lateral [There are no fractures, subluxations or dislocations. No significant abnormalities are seen] : There are no fractures, subluxations or dislocations. No significant abnormalities are seen

## 2023-03-06 NOTE — PHYSICAL EXAM
[Right] : right hip [] : no groin pain with resisted straight leg raise [de-identified] : no pain with resisted abduction [de-identified] : mildly positive Yesy test.

## 2023-03-06 NOTE — DISCUSSION/SUMMARY
[de-identified] : 3/6/23: X-ray with only mild djd of the right hip, all groin pain with +impingement on exam.  Pain persistent over the past 1 month without relief with NSAIDs and conservative treatment. . Recommend MRI of the right hip to eval possible labral tear to guide future treatment or possible surgical intervention. Will start her on meloxicam and see how she does. Pt will f/up after MRI. \par \par

## 2023-03-30 ENCOUNTER — RESULT REVIEW (OUTPATIENT)
Age: 68
End: 2023-03-30

## 2023-04-03 ENCOUNTER — APPOINTMENT (OUTPATIENT)
Dept: ORTHOPEDIC SURGERY | Facility: CLINIC | Age: 68
End: 2023-04-03
Payer: MEDICARE

## 2023-04-03 VITALS — HEIGHT: 61 IN | WEIGHT: 134 LBS | BODY MASS INDEX: 25.3 KG/M2

## 2023-04-03 PROCEDURE — 99213 OFFICE O/P EST LOW 20 MIN: CPT | Mod: 25

## 2023-04-03 PROCEDURE — 20551 NJX 1 TENDON ORIGIN/INSJ: CPT | Mod: RT

## 2023-04-03 PROCEDURE — J3490M: CUSTOM

## 2023-04-03 PROCEDURE — 76942 ECHO GUIDE FOR BIOPSY: CPT

## 2023-04-03 PROCEDURE — 99214 OFFICE O/P EST MOD 30 MIN: CPT | Mod: 25

## 2023-04-03 NOTE — IMAGING
[Disc space narrowing] : Disc space narrowing [Right] : right hip with pelvis [AP] : anteroposterior [Lateral] : lateral [There are no fractures, subluxations or dislocations. No significant abnormalities are seen] : There are no fractures, subluxations or dislocations. No significant abnormalities are seen [de-identified] : MRI rt hip \par glut med tendonitis - no labral tear no sig OA

## 2023-04-03 NOTE — PHYSICAL EXAM
[Right] : right hip [] : no groin pain with resisted straight leg raise [de-identified] : no pain with resisted abduction [de-identified] : mildly positive Yesy test.

## 2023-04-03 NOTE — HISTORY OF PRESENT ILLNESS
[8] : 8 [Dull/Aching] : dull/aching [Sharp] : sharp [] : yes [Constant] : constant [Meds] : meds [Sitting] : sitting [de-identified] : 4/3/23 did not do PT - has mult issues so concerned where to start - has hx of L spine dx aswell Meds on ly some help \par \par Prev Doc:\par 3/6/23: 67f presents with right hip pain over the groin and also c/o pain over the buttock and posterior thigh. Started 4 weeks ago without specific injury. Went to an ER, underwent CT scan. 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 \par Retired.  [FreeTextEntry1] : rt hip [FreeTextEntry7] : down [de-identified] : MRI

## 2023-04-03 NOTE — PROCEDURE
[FreeTextEntry3] : Large joint injection was performed of the ____ greater trochanteric bursa. The indication for this procedure was pain and inflammation. The site was prepped with alcohol, betadine, ethyl chloride sprayed topically and sterile technique used. An injection of Betamethasone (Celestone) 2cc of 3mg, Lidocaine 7cc of 1% , Bupivacaine (Marcaine) 4cc of 0.25%  was used. Patient tolerated procedure well. Patient was advised to call if redness, pain or fever occur and apply ice for 15 minutes out of every hour for the next 12-24 hours as tolerated. \par \par Patient has tried OTC's including aspirin, Ibuprofen, Aleve, etc or prescription NSAIDS, and/or exercises at home and/or physical therapy without satisfactory response, patient had decreased mobility in the joint and the risks benefits, and alternatives have been discussed, and verbal consent was obtained.

## 2023-04-03 NOTE — ASSESSMENT
[FreeTextEntry1] : Prev Doc:\par 3/6/23: X-ray with only mild djd of the right hip, all groin pain with +impingement on exam.  Pain persistent over the past 1 month without relief with NSAIDs and conservative treatment. . Recommend MRI of the right hip to eval possible labral tear to guide future treatment or possible surgical intervention. Will start her on meloxicam and see how she does. Pt will f/up after MRI. \par \par 4/3/23 try inj today and explained improtance of PT for this issue - I do not feel she has intraarticular pathology \par \par \par \par The patient was advised of the diagnosis.  The natural history of the pathology was explained in full to the patient in layman's terms. All questions were answered.  The risks and benefits of surgical and non-surgical treatment alternatives were explained in full to the patient.\par \par

## 2023-05-15 ENCOUNTER — APPOINTMENT (OUTPATIENT)
Dept: ORTHOPEDIC SURGERY | Facility: CLINIC | Age: 68
End: 2023-05-15
Payer: MEDICARE

## 2023-05-15 VITALS — WEIGHT: 134 LBS | HEIGHT: 61 IN | BODY MASS INDEX: 25.3 KG/M2

## 2023-05-15 PROCEDURE — 99213 OFFICE O/P EST LOW 20 MIN: CPT

## 2023-05-15 NOTE — DISCUSSION/SUMMARY
[de-identified] : The patient was advised of the diagnosis.  The natural history of the pathology was explained in full to the patient in layman's terms. All questions were answered.  The risks and benefits of surgical and non-surgical treatment alternatives were explained in full to the patient.\par

## 2023-05-15 NOTE — HISTORY OF PRESENT ILLNESS
[8] : 8 [Dull/Aching] : dull/aching [Sharp] : sharp [Constant] : constant [Meds] : meds [Sitting] : sitting [de-identified] : 5/15/23: Bursa inj helped for a short period.  Also had lumbar DALY the following day.  Pain still lateral hip with burning.  No groin pain.\par \par Prev Doc:\par 3/6/23: 67f presents with right hip pain over the groin and also c/o pain over the buttock and posterior thigh. Started 4 weeks ago without specific injury. Went to an ER, underwent CT scan. 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 \par Retired. \par 4/3/23 did not do PT - has mult issues so concerned where to start - has hx of L spine dx aswell Meds on ly some help  [] : no [FreeTextEntry1] : Rt hip [FreeTextEntry5] : Pt feels better after injection, Pt states she has 70% improvement in pain  [FreeTextEntry7] : down [de-identified] : MRI

## 2023-05-15 NOTE — PHYSICAL EXAM
[Right] : right hip [] : no groin pain with resisted straight leg raise [de-identified] : no pain with resisted abduction [de-identified] : mildly positive Yesy test.

## 2023-05-15 NOTE — ASSESSMENT
[FreeTextEntry1] : Prev Doc:\par 3/6/23: X-ray with only mild djd of the right hip, all groin pain with +impingement on exam.  Pain persistent over the past 1 month without relief with NSAIDs and conservative treatment. . Recommend MRI of the right hip to eval possible labral tear to guide future treatment or possible surgical intervention. Will start her on meloxicam and see how she does. Pt will f/up after MRI. \par 4/3/23 try inj today and explained improtance of PT for this issue - I do not feel she has intraarticular pathology \par \par 5/15/23: She continues to have lateral hip pain but still appears to be related to Lspine.  Will refer to spine specialist for further eval.  Return to me prn.

## 2023-06-16 ENCOUNTER — APPOINTMENT (OUTPATIENT)
Dept: ORTHOPEDIC SURGERY | Facility: CLINIC | Age: 68
End: 2023-06-16
Payer: MEDICARE

## 2023-06-16 VITALS — BODY MASS INDEX: 25.3 KG/M2 | WEIGHT: 134 LBS | HEIGHT: 61 IN

## 2023-06-16 DIAGNOSIS — Z78.9 OTHER SPECIFIED HEALTH STATUS: ICD-10-CM

## 2023-06-16 PROCEDURE — 99214 OFFICE O/P EST MOD 30 MIN: CPT

## 2023-06-23 NOTE — PHYSICAL EXAM
[4___] : left extensor hallicus longus 4[unfilled]/5 [5___] : right extensor hallicus longus 5[unfilled]/5 [de-identified] : Constitutional:\par - General Appearance:\par Unremarkable\par Body Habitus\par Well Developed\par Well Nourished\par Body Habitus\par No Deformities\par Well Groomed\par Ability To communicate:\par Normal\par Neurologic:\par Global sensation is intact to upper and lower extremities. See examination of Neck and/or Spine\par for exceptions.\par Orientation to Time, Place and Person is: Normal\par Mood And Affect is Normal\par Skin:\par - Head/Face, Right Upper/Lower Extremity, Left Upper/Lower Extremity: Normal\par See Examination of Neck and/or Spine for exceptions\par Cardiovascular:\par Peripheral Cardiovascular System is Normal\par Palpation of Lymph Nodes:\par Normal Palpation of lymph nodes in: Axilla, Cervical, Inguinal\par Abnormal Palpation of lymph nodes in: None  [] : negative tight hamstring [FreeTextEntry8] : sciatic nerve tenderness not past knee

## 2023-06-23 NOTE — ASSESSMENT
[FreeTextEntry1] : 67 y/o female with right lower extremity radiculopathy consistent with the noted stenosis on last years MRI but I suspect she may have developed a disc herniation or cyst that is making this worse, I am requesting a new Lumbar MRI to evaluate for spinal cord compression. Follow up in 1-2 weeks to review imaging. \par \par Prior to appointment and during encounter with patient extensive medical records were reviewed including but not limited to, hospital records, out patient records, imaging results, and lab data. During this appointment the patient was examined, diagnoses were discussed and explained in a face to face manner. In addition extensive time was spent reviewing aforementioned diagnostic studies. Counseling including abnormal image results, differential diagnoses, treatment options, risk and benefits, lifestyle changes, current condition, and current medications was performed. Patient's comments, questions, and concerns were address and patient verbalized understanding. Based on this patient's presentation at our office, which is an orthopedic spine surgeon's office, this patient inherently / intrinsically has a risk, however minute, of developing issues such as Cauda equina syndrome, bowel and bladder changes, or progression of motor or neurological deficits such as paralysis which may be permanent. \par \par I, Patience Colbert, attest that this documentation has been prepared under the direction and in the presence of provider Osbaldo Burnham MD.

## 2023-06-23 NOTE — HISTORY OF PRESENT ILLNESS
[Lower back] : lower back [10] : 10 [8] : 8 [Sharp] : sharp [Shooting] : shooting [] : yes [Constant] : constant [Meds] : meds [Retired] : Work status: retired [de-identified] : 06/16/2023: Patient presenting today for an initial evaluation. Patient reports right hip pain that radiates and wraps around to right knee that began in February.  Patient reports difficulty going up stairs. Patient has a history of back pain. Patient has treated with trigger point injections, and epidural injections, reports no relief in symptoms. Patient was referred by Dr. Felipe and has been treated by Dr. Martínez. \par \par Hx:1st cervical DALY on 05/11/2022, 2nd cervical DALY on 10/05/2022, right L5-S1 TFESI on 07/29/2022\par  [FreeTextEntry7] : to the knee [FreeTextEntry1] : lower back/rt hip [FreeTextEntry9] : gabapentin  [de-identified] : bending [de-identified] : MRI was done at [facility] ([place])

## 2023-06-23 NOTE — DATA REVIEWED
[FreeTextEntry1] : On my interpretation of these images from [facility] on 02/15/2022: Cervical MRI \par C2-3: nl\par C3-4: nl\par C4-5: mild central disc herniation mild stenosis\par C5-6: mod DDD w/ mild to mod R fs\par C6-7: mod DDD w/ mild b/l fs \par C7-T1:  nl\par \par On my interpretation of these images from [facility] on 03/20/2023: R hip MRI: \par mod R gluteus medius tendinosis \par \par On my interpretation of these images from [facility] on 06/30/22: Lumbar MRI\par L5-S1: right adv facet arthropathy w/ mod right fs over L5 root possibility for R pars lysis\par L4-5: mild to mod facet arthropathy mild to mod recess stenosis\par L3-4: NL\par L2- NL\par L1-2: disc bulge\par T12-L1: NL\par \par

## 2023-06-28 ENCOUNTER — RESULT REVIEW (OUTPATIENT)
Age: 68
End: 2023-06-28

## 2023-06-30 ENCOUNTER — APPOINTMENT (OUTPATIENT)
Dept: ORTHOPEDIC SURGERY | Facility: CLINIC | Age: 68
End: 2023-06-30
Payer: MEDICARE

## 2023-06-30 VITALS — HEIGHT: 61 IN | BODY MASS INDEX: 25.3 KG/M2 | WEIGHT: 134 LBS

## 2023-06-30 DIAGNOSIS — M47.816 SPONDYLOSIS W/OUT MYELOPATHY OR RADICULOPATHY, LUMBAR REGION: ICD-10-CM

## 2023-06-30 DIAGNOSIS — M54.16 RADICULOPATHY, LUMBAR REGION: ICD-10-CM

## 2023-06-30 DIAGNOSIS — M76.891 OTHER SPECIFIED ENTHESOPATHIES OF RIGHT LOWER LIMB, EXCLUDING FOOT: ICD-10-CM

## 2023-06-30 PROCEDURE — 99214 OFFICE O/P EST MOD 30 MIN: CPT

## 2023-07-05 NOTE — ASSESSMENT
[FreeTextEntry1] : 67 y/o female with right trochanteric bursitis, fits better than symptomatology of L5 radiculopathy. There is a mild to mod stenosis over L5, we can test idea of radiculopathy with TFESI of right L5-S1. She has previously had bursal injection and responded well supporting diagnosis of bursitis. Follow up on an as needed basis.\par \par Prior to appointment and during encounter with patient extensive medical records were reviewed including but not limited to, hospital records, out patient records, imaging results, and lab data. During this appointment the patient was examined, diagnoses were discussed and explained in a face to face manner. In addition extensive time was spent reviewing aforementioned diagnostic studies. Counseling including abnormal image results, differential diagnoses, treatment options, risk and benefits, lifestyle changes, current condition, and current medications was performed. Patient's comments, questions, and concerns were address and patient verbalized understanding. Based on this patient's presentation at our office, which is an orthopedic spine surgeon's office, this patient inherently / intrinsically has a risk, however minute, of developing issues such as Cauda equina syndrome, bowel and bladder changes, or progression of motor or neurological deficits such as paralysis which may be permanent. \par \par I, Patience Colbert, attest that this documentation has been prepared under the direction and in the presence of provider Osbaldo Burnham MD.

## 2023-07-05 NOTE — PHYSICAL EXAM
[4___] : left extensor hallicus longus 4[unfilled]/5 [5___] : right extensor hallicus longus 5[unfilled]/5 [de-identified] : Constitutional:\par - General Appearance:\par Unremarkable\par Body Habitus\par Well Developed\par Well Nourished\par Body Habitus\par No Deformities\par Well Groomed\par Ability To communicate:\par Normal\par Neurologic:\par Global sensation is intact to upper and lower extremities. See examination of Neck and/or Spine\par for exceptions.\par Orientation to Time, Place and Person is: Normal\par Mood And Affect is Normal\par Skin:\par - Head/Face, Right Upper/Lower Extremity, Left Upper/Lower Extremity: Normal\par See Examination of Neck and/or Spine for exceptions\par Cardiovascular:\par Peripheral Cardiovascular System is Normal\par Palpation of Lymph Nodes:\par Normal Palpation of lymph nodes in: Axilla, Cervical, Inguinal\par Abnormal Palpation of lymph nodes in: None  [] : clonus not sustained at ankle [FreeTextEntry8] : sciatic nerve tenderness not past knee

## 2023-07-05 NOTE — HISTORY OF PRESENT ILLNESS
[10] : 10 [8] : 8 [Sharp] : sharp [Shooting] : shooting [Meds] : meds [Bending forward] : bending forward [Retired] : Work status: retired [de-identified] : 06/30/2023: Patient presenting today for a MRI results review. Patient reports severe pain in anterior right hip. Patient reports pain progresses as the day goes on. Reports pain with forward flexion. \par \par 06/16/2023: Patient presenting today for an initial evaluation. Patient reports right hip pain that radiates and wraps around to right knee that began in February.  Patient reports difficulty going up stairs. Patient has a history of back pain. Patient has treated with trigger point injections, and epidural injections, reports no relief in symptoms. Patient was referred by Dr. Felipe and has been treated by Dr. Martínez. \par \par Injection Hx:1st cervical DALY on 05/11/2022, 2nd cervical DALY on 10/05/2022, 1st right L5-S1 TFESI on 07/29/2022\par  [FreeTextEntry1] : lower back/ right hip

## 2023-07-05 NOTE — DATA REVIEWED
[FreeTextEntry1] : On my interpretation of these images from [facility] on 6/28/2023:Lumbar MRI \par L5-S1: mod to adv R facet degeneration mild L facet degeneration and a mod R fs \par L4-5: mild to mod facet arthropathy b/l mild fs\par L3-4: mild DDD w/o stenosis \par L2-3: mild DDD w/o stenosis \par L1-2: mild DDD w/o stenosis \par T12-L1: mild DDD w/o stenosis \par no significant compression throughout\par

## 2023-08-03 ENCOUNTER — APPOINTMENT (OUTPATIENT)
Dept: PAIN MANAGEMENT | Facility: CLINIC | Age: 68
End: 2023-08-03
Payer: MEDICARE

## 2023-08-03 VITALS — BODY MASS INDEX: 25.3 KG/M2 | HEIGHT: 61 IN | WEIGHT: 134 LBS

## 2023-08-03 DIAGNOSIS — M51.36 OTHER INTERVERTEBRAL DISC DEGENERATION, LUMBAR REGION: ICD-10-CM

## 2023-08-03 DIAGNOSIS — M79.18 MYALGIA, OTHER SITE: ICD-10-CM

## 2023-08-03 PROCEDURE — 99214 OFFICE O/P EST MOD 30 MIN: CPT

## 2023-08-03 NOTE — ASSESSMENT
[FreeTextEntry1] : A thorough discussion occurred regarding available pain management treatment options including interventional, rehabilitative, pharmacological, and alternative modalities with the patient. We will proceed with the following:  Interventional treatment options: - Proceed with right L5-S1, S1 TFESI (80 mg of Kenalog) with flouroscopic guidance  - discussed potential therapeutic/diagnostic role injection will play - Minimal relief with right GTB injection - see additional instructions below  Rehabilitative options: - initiate PT for lumbar spine/right hamstring tendonitis once adequate relief - encouraged participation in active HEP as tolerated  Medication based treatment options: - Poor candidate for NSAIDs secondary to GI sensitivity - contiue tramadol as per PCP - continue Gabapentin 200mg TID as per neuro - see additional instructions below  Complementary treatment options: - lifestyle modifications discussed  Additional treatment recommendations as follows: - Follow up 1-2 weeks post injection for assessment of efficacy and further recommendations.  The risks, benefits and alternatives of the proposed procedure were explained in detail with the patient.  The risks outlined include, but are not limited to, infection, bleeding, nerve injury, post dural headache, a temporary increase in pain, failure to resolve symptoms, allergic reaction, and possible elevation of blood sugar in diabetics if using corticosteroid.  All questions were answered to patient's apparent satisfaction and he/she verbalized an understanding.  I, Arpan CARRILLO-SOWMYA, acting as scribe, attest that this documentation has been prepared under the direction and in the presence of the provider Adarsh Martínez DO.  The documentation recorded by the scribe, in my presence, accurately reflects the service I personally performed and the decisions made by me with my edits as appropriate.

## 2023-08-03 NOTE — PHYSICAL EXAM
[de-identified] : Constitutional: - No acute distress - Well developed; well nourished  Neurological: - normal mood and affect - alert and oriented x 3  Cardiovascular: - grossly normal  Lumbar Spine Exam:  Inspection:  erythema (-)  ecchymosis (-)  rashes (-)  alignment: no scoliosis  Palpation:  paraspinal tenderness: L (-) ; R (-)  thoracic paraspinal tenderness: L (-) ; R (-)  sciatic nerve tenderness : L (-) ; R (+)  SI joint tenderness: L (-) ; R (-)  GTB tenderness: L (-); R (-)  Ischial bursa tenderness L(-); R(+)  ROM:  Full ROM with mild stiffness with flexion Pain with flexion >extension  Strength: Right Left  Hip Flexion: (5/5) (5/5)  Quadriceps: (5/5) (5/5)  Hamstrings: (5/5) (5/5)  Ankle Dorsiflexion: (5/5) (5/5)  EHL: (5/5) (5/5)  Ankle Plantarflexion: (5/5) (5/5)   Special Tests:  SLR: R (Eq) ; L (-)  Facet loading: R (+) ; L (+)  ARINA test: R (-) ; L (-)  Tight Hamstrings R (+) ; L (-)   Neurologic:  Light touch intact throughout LE  Reflexes normal and symmetric   Gait:  non- antalgic gait

## 2023-08-03 NOTE — HISTORY OF PRESENT ILLNESS
[Neck] : neck [Lower back] : lower back [10] : 10 [Burning] : burning [Dull/Aching] : dull/aching [Constant] : constant [Sleep] : sleep [Meds] : meds [Nothing helps with pain getting better] : Nothing helps with pain getting better [Sitting] : sitting [Standing] : standing [Walking] : walking [Lying in bed] : lying in bed [Retired] : Work status: retired [FreeTextEntry1] : 8/3/2023 - Patient presents for FUV regarding their lower back pain, referred by Dr. Burnham for interventional therapy.  Patient reports ongoing right buttock. lateral hip and lateral thigh pain.  She had MRI hip and right GTB injection which gave her mild relief but not sustained.   Had evaluation with Dr. Felipe  10/25/22 -  Patient presents after C7-T1 MELISSA on 10/5/2022.  She reports that she had about 75% improvement from procedure; less so than with previous cervical injection. She states after the procedure her radicular pain returned about a week later although not as severe. She complains of neck pain which radiates down her right upper extremities. Paresthesias to right upper extremity. She has a history of carpal tunnel. She also notes right sciatic nerve tenderness.   22 - Patient presents for a FUV following an right L5-S1 TFESI on 22. Patient reports almost 100% resolution for a few weeks followed by a return of pain, almost to baseline.   Patient also c/o neck pain radiating to the RUE. Denies strength loss to the UE. Utilizes OTC Advil with benefit. She reports GI distress with meloxicam. Good relief with prior MELISSA.   22 - Patient presents for MRI review. Patient c/o constant axial lower back pain with radiation to the right buttock pain and right lower extremity. Was unable to tolerate Meloxicam due to GI upset.    22 - Patient presents for a FUV following an C7-T1 MELISSA on 22. Patient reports 80% resolution of symptoms following the injection. She is pleased with her response to the injection. Patient reports increase strength and ROM  Patient c/o low back pain radiating to the right buttock and RLE. Ongoing for months. Patient denies b/b dysfunction, LE weakness, or saddle numbness.   03/10/22 - Patient presents for initial evaluation. Patient c/o neck pain radiating to the right shoulder, elbow and fingers. Patient reports 60/40 neck versus arm pain. Patient reports dropping items from the right hand. Patient reports numbness to the right digits. Patient reports ongoing exacerbation of pain started 2 months ago.   Previous Injections:  1) C7-T1 MELISSA - (22, 10/5/22)  2) right L5-S1 TFESI (22)   Pertinent Surgical History: N/A  Imagin) Cervical Spine MRI 02/15/22 - [facility]  C2-C3 :There is no significant spinal canal or neural foraminal stenosis. C3-C4 :There is no significant spinal canal or neural foraminal stenosis. C4-C5: Mild uncovering of the disc and a disc osteophyte complex mildly indent the ventral thecal sac. There is mild bilateral facet arthrosis. C5-C6: Uncovering of the disc and a disc osteophyte complex with bilateral uncovertebral joint spurring effaces the anterior cerebrospinal fluid column and mildly narrow the right neural foramen, progressed since 2019. There is left facet arthrosis. C6-C7: A disc osteophyte complex and bilateral uncovertebral joint spurring mildly effaces the anterior cerebrospinal fluid column and mildly narrow the right neural foramen, progressed in the interim. C7-T1: Mild uncovering of the disc and mild narrows the right neural foramen, progressed in the interim. There is no significant spinal canal stenosis. The posterior paraspinal muscles are symmetric.  2) Lumbar Spine MRI (2023) - [facility]  The L5-S1, L4-5 and L3-4 levels show degenerative disc disease without focaldisc herniation or central spinal stenosis. Facet arthropathy. The neural foramina patent. The L2-3, L1-2, T12-L1 levels show degenerative disc disease without focal disc herniation or central spinal stenosis. The neural foramina patent.  3) MRI Right Hip (3/30/2023) - [facility] Osseous Structures: No significant degenerative changes are identified. There is no evidence for fractures or avascular necrosis. Disc desiccation/degeneration of the intervertebral discs is present at the lumbosacral junction. Mild degeneration of the sacroiliac joints is present. Overall, the bone marrow signal is age-appropriate. Effusion: No significant hip joint effusion is present. Labrum: No discrete labral tears are identified. Tendons: Moderate right gluteus medius tendinosis is present, without tears. Tendinosis and partial tearing is present at the origin of the hamstring tendons from the ischial tuberosity. The iliopsoas tendon is unremarkable. No significant amount of fluid is present in the bursae. There is no evidence for inguinal adenopathy. The sciatic nerve has a normal appearance.  Physician Disclaimer: I have personally reviewed and confirmed all HPI data with the patient.  [] : no [FreeTextEntry7] : B/L SHOULDERS , RT HIP  [de-identified] : L MRI

## 2023-10-04 ENCOUNTER — APPOINTMENT (OUTPATIENT)
Dept: PAIN MANAGEMENT | Facility: CLINIC | Age: 68
End: 2023-10-04
Payer: MEDICARE

## 2023-10-04 PROCEDURE — 64484 NJX AA&/STRD TFRM EPI L/S EA: CPT | Mod: RT

## 2023-10-04 PROCEDURE — 64483 NJX AA&/STRD TFRM EPI L/S 1: CPT | Mod: RT

## 2023-10-23 ENCOUNTER — APPOINTMENT (OUTPATIENT)
Dept: PAIN MANAGEMENT | Facility: CLINIC | Age: 68
End: 2023-10-23
Payer: MEDICARE

## 2023-10-23 VITALS — BODY MASS INDEX: 25.49 KG/M2 | WEIGHT: 135 LBS | HEIGHT: 61 IN

## 2023-10-23 DIAGNOSIS — M70.61 TROCHANTERIC BURSITIS, RIGHT HIP: ICD-10-CM

## 2023-10-23 DIAGNOSIS — M54.16 RADICULOPATHY, LUMBAR REGION: ICD-10-CM

## 2023-10-23 DIAGNOSIS — M76.31 ILIOTIBIAL BAND SYNDROME, RIGHT LEG: ICD-10-CM

## 2023-10-23 PROCEDURE — 99214 OFFICE O/P EST MOD 30 MIN: CPT | Mod: 25

## 2023-10-23 PROCEDURE — 20610 DRAIN/INJ JOINT/BURSA W/O US: CPT | Mod: RT

## 2023-10-23 PROCEDURE — J3490M: CUSTOM

## 2025-02-25 ENCOUNTER — APPOINTMENT (OUTPATIENT)
Dept: ORTHOPEDIC SURGERY | Facility: CLINIC | Age: 70
End: 2025-02-25
Payer: MEDICARE

## 2025-02-25 DIAGNOSIS — S49.91XA UNSPECIFIED INJURY OF RIGHT SHOULDER AND UPPER ARM, INITIAL ENCOUNTER: ICD-10-CM

## 2025-02-25 DIAGNOSIS — G89.29 PAIN IN RIGHT SHOULDER: ICD-10-CM

## 2025-02-25 DIAGNOSIS — M79.18 MYALGIA, OTHER SITE: ICD-10-CM

## 2025-02-25 DIAGNOSIS — M25.511 PAIN IN RIGHT SHOULDER: ICD-10-CM

## 2025-02-25 DIAGNOSIS — M24.811 OTHER SPECIFIC JOINT DERANGEMENTS OF RIGHT SHOULDER, NOT ELSEWHERE CLASSIFIED: ICD-10-CM

## 2025-02-25 PROCEDURE — 99214 OFFICE O/P EST MOD 30 MIN: CPT | Mod: 25

## 2025-02-25 PROCEDURE — 73030 X-RAY EXAM OF SHOULDER: CPT | Mod: RT

## 2025-02-26 ENCOUNTER — APPOINTMENT (OUTPATIENT)
Dept: ORTHOPEDIC SURGERY | Facility: CLINIC | Age: 70
End: 2025-02-26